# Patient Record
Sex: FEMALE | Race: BLACK OR AFRICAN AMERICAN | Employment: OTHER | URBAN - METROPOLITAN AREA
[De-identification: names, ages, dates, MRNs, and addresses within clinical notes are randomized per-mention and may not be internally consistent; named-entity substitution may affect disease eponyms.]

---

## 2021-06-22 ENCOUNTER — OFFICE VISIT (OUTPATIENT)
Dept: FAMILY MEDICINE CLINIC | Age: 86
End: 2021-06-22
Payer: MEDICAID

## 2021-06-22 VITALS
BODY MASS INDEX: 18.43 KG/M2 | SYSTOLIC BLOOD PRESSURE: 122 MMHG | OXYGEN SATURATION: 100 % | RESPIRATION RATE: 16 BRPM | TEMPERATURE: 97.2 F | DIASTOLIC BLOOD PRESSURE: 67 MMHG | HEART RATE: 60 BPM | WEIGHT: 104 LBS | HEIGHT: 63 IN

## 2021-06-22 DIAGNOSIS — I10 ESSENTIAL HYPERTENSION: ICD-10-CM

## 2021-06-22 DIAGNOSIS — M25.562 CHRONIC PAIN OF LEFT KNEE: Primary | ICD-10-CM

## 2021-06-22 DIAGNOSIS — G89.29 CHRONIC PAIN OF LEFT KNEE: Primary | ICD-10-CM

## 2021-06-22 DIAGNOSIS — J30.9 ALLERGIC RHINITIS, UNSPECIFIED SEASONALITY, UNSPECIFIED TRIGGER: ICD-10-CM

## 2021-06-22 PROCEDURE — 99203 OFFICE O/P NEW LOW 30 MIN: CPT | Performed by: NURSE PRACTITIONER

## 2021-06-22 RX ORDER — FUROSEMIDE 20 MG/1
20 TABLET ORAL DAILY
COMMUNITY
End: 2021-06-22 | Stop reason: SDUPTHER

## 2021-06-22 RX ORDER — DEXTROMETHORPHAN HYDROBROMIDE, GUAIFENESIN 5; 100 MG/5ML; MG/5ML
650 LIQUID ORAL
COMMUNITY

## 2021-06-22 RX ORDER — AMIODARONE HYDROCHLORIDE 100 MG/1
100 TABLET ORAL DAILY
COMMUNITY

## 2021-06-22 RX ORDER — FLUTICASONE PROPIONATE 50 MCG
2 SPRAY, SUSPENSION (ML) NASAL DAILY
COMMUNITY
End: 2021-06-22 | Stop reason: SDUPTHER

## 2021-06-22 RX ORDER — AMLODIPINE BESYLATE 5 MG/1
5 TABLET ORAL DAILY
COMMUNITY
End: 2021-06-22 | Stop reason: SDUPTHER

## 2021-06-22 RX ORDER — FUROSEMIDE 20 MG/1
20 TABLET ORAL DAILY
Qty: 30 TABLET | Refills: 3 | Status: SHIPPED | OUTPATIENT
Start: 2021-06-22 | End: 2022-03-21 | Stop reason: SDUPTHER

## 2021-06-22 RX ORDER — FLUTICASONE PROPIONATE 50 MCG
2 SPRAY, SUSPENSION (ML) NASAL DAILY
Qty: 1 BOTTLE | Refills: 3 | Status: SHIPPED | OUTPATIENT
Start: 2021-06-22

## 2021-06-22 RX ORDER — AMLODIPINE BESYLATE 5 MG/1
5 TABLET ORAL DAILY
Qty: 30 TABLET | Refills: 3 | Status: SHIPPED | OUTPATIENT
Start: 2021-06-22 | End: 2022-03-09 | Stop reason: SDUPTHER

## 2021-06-22 RX ORDER — MELOXICAM 7.5 MG/1
7.5 TABLET ORAL DAILY
Qty: 30 TABLET | Refills: 2 | Status: SHIPPED | OUTPATIENT
Start: 2021-06-22 | End: 2021-09-15

## 2021-06-22 NOTE — PROGRESS NOTES
Sung Crawley is a 80 y.o. female (: 1924) presenting to address:    Chief Complaint   Patient presents with    Knee Pain     left knee going on for two months     Medication Refill     norvasc,flonase lasix       Vitals:    21 1429   BP: 122/67   Pulse: 60   Resp: 16   Temp: 97.2 °F (36.2 °C)   TempSrc: Temporal   SpO2: 100%   Weight: 104 lb (47.2 kg)   Height: 5' 3\" (1.6 m)   PainSc:   2   PainLoc: Knee       Hearing/Vision:   No exam data present    Learning Assessment:   No flowsheet data found. Depression Screening:     3 most recent PHQ Screens 2021   Little interest or pleasure in doing things Not at all   Feeling down, depressed, irritable, or hopeless Not at all   Total Score PHQ 2 0     Fall Risk Assessment:     Fall Risk Assessment, last 12 mths 2021   Able to walk? Yes   Fall in past 12 months? 1   Do you feel unsteady? 1   Are you worried about falling 1   Is TUG test greater than 12 seconds? 1   Is the gait abnormal? 1   Number of falls in past 12 months 1   Fall with injury? 0     Abuse Screening:   No flowsheet data found. Coordination of Care Questionaire:   1. Have you been to the ER, urgent care clinic since your last visit? Hospitalized since your last visit? NO    2. Have you seen or consulted any other health care providers outside of the [address] [place] [address] since your last visit? Include any pap smears or colon screening. NO    Advanced Directive:   1. Do you have an Advanced Directive? NO    2. Would you like information on Advanced Directives?  NO

## 2021-06-29 NOTE — PROGRESS NOTES
ROMULO  Erick Bowden is a 80y.o. year old female who presents today for left knee pain. She is accompanied by her daughter who is the main historian. She recently switched insurances and lost all of her doctors as a result. She has known arthritis and ongoing left knee issues for which she had previously been seeing ortho, but they no longer accept her insurance. Would like a referral.         Past Medical History:   Diagnosis Date    HTN (hypertension)        Past Surgical History:   Procedure Laterality Date    HX CATARACT REMOVAL           Social History     Tobacco Use    Smoking status: Never Smoker    Smokeless tobacco: Never Used   Vaping Use    Vaping Use: Never used   Substance Use Topics    Alcohol use: Never    Drug use: Never         Current Outpatient Medications:     acetaminophen (Tylenol Arthritis Pain) 650 mg TbER, Take 650 mg by mouth two (2) times daily as needed for Pain., Disp: , Rfl:     amiodarone (PACERONE) 100 mg tablet, Take 100 mg by mouth daily. , Disp: , Rfl:     meloxicam (MOBIC) 7.5 mg tablet, Take 1 Tablet by mouth daily. , Disp: 30 Tablet, Rfl: 2    furosemide (LASIX) 20 mg tablet, Take 1 Tablet by mouth daily. , Disp: 30 Tablet, Rfl: 3    amLODIPine (NORVASC) 5 mg tablet, Take 1 Tablet by mouth daily. , Disp: 30 Tablet, Rfl: 3    fluticasone propionate (Flonase Allergy Relief) 50 mcg/actuation nasal spray, 2 Sprays by Both Nostrils route daily. , Disp: 1 Bottle, Rfl: 3     Allergies   Allergen Reactions    Pcn [Penicillins] Swelling       Review of Systems   Constitutional: Negative for chills, fever, malaise/fatigue and weight loss. HENT: Negative for congestion, ear pain, hearing loss, sinus pain, sore throat and tinnitus. Eyes: Negative for blurred vision, double vision, photophobia and pain. Respiratory: Negative for cough and shortness of breath. Cardiovascular: Negative for chest pain, palpitations and leg swelling.    Gastrointestinal: Negative for abdominal pain, constipation, diarrhea, heartburn, nausea and vomiting. Genitourinary: Negative for dysuria, frequency and urgency. Musculoskeletal: Positive for joint pain (left knee). Negative for back pain and myalgias. Skin: Negative for rash. Neurological: Negative for dizziness and headaches. Psychiatric/Behavioral: Negative for depression and suicidal ideas. The patient is not nervous/anxious. PE  /67   Pulse 60   Temp 97.2 °F (36.2 °C) (Temporal)   Resp 16   Ht 5' 3\" (1.6 m) Comment: per patient  Wt 104 lb (47.2 kg)   SpO2 100%   BMI 18.42 kg/m²     Physical Exam  Vitals reviewed. Constitutional:       General: She is not in acute distress. Appearance: Normal appearance. HENT:      Head: Normocephalic and atraumatic. Cardiovascular:      Rate and Rhythm: Normal rate and regular rhythm. Heart sounds: S1 normal and S2 normal. No murmur heard. No friction rub. No gallop. No S3 or S4 sounds. Pulmonary:      Effort: Pulmonary effort is normal.      Breath sounds: Normal breath sounds. No wheezing, rhonchi or rales. Musculoskeletal:      Left knee: Crepitus present. No swelling, effusion or bony tenderness. Normal range of motion. No tenderness. Right lower leg: No edema. Left lower leg: No edema. Skin:     General: Skin is warm and dry. Capillary Refill: Capillary refill takes less than 2 seconds. Neurological:      Mental Status: She is alert and oriented to person, place, and time. Assessment/Plan  1. Knee pain  mobic 7.5 every day  Refer to ortho for further evaluation    2. HTN  BP good  Medications refilled    3.  Preventive care  FU for MWV and fasting labs

## 2021-07-11 PROBLEM — S72.001A CLOSED HIP FRACTURE, RIGHT, INITIAL ENCOUNTER (HCC): Status: ACTIVE | Noted: 2021-07-11

## 2021-08-30 ENCOUNTER — TELEPHONE (OUTPATIENT)
Dept: FAMILY MEDICINE CLINIC | Age: 86
End: 2021-08-30

## 2021-08-30 NOTE — TELEPHONE ENCOUNTER
Pt's daughter called to request a call back. She states her mother is having trouble sleeping at night and wakes up in a panic. She wants to know if there is anything over the counter that is safe for her mother to take to help her sleep. Please advise.

## 2021-09-15 RX ORDER — MELOXICAM 7.5 MG/1
7.5 TABLET ORAL DAILY
Qty: 30 TABLET | Refills: 2 | Status: SHIPPED | OUTPATIENT
Start: 2021-09-15 | End: 2022-01-03 | Stop reason: SDUPTHER

## 2022-01-03 RX ORDER — MELOXICAM 7.5 MG/1
7.5 TABLET ORAL DAILY
Qty: 30 TABLET | Refills: 2 | Status: SHIPPED | OUTPATIENT
Start: 2022-01-03

## 2022-03-09 ENCOUNTER — TELEPHONE (OUTPATIENT)
Dept: FAMILY MEDICINE CLINIC | Age: 87
End: 2022-03-09

## 2022-03-09 RX ORDER — AMLODIPINE BESYLATE 5 MG/1
5 TABLET ORAL DAILY
Qty: 90 TABLET | Refills: 0 | Status: SHIPPED | OUTPATIENT
Start: 2022-03-09

## 2022-03-09 RX ORDER — AMIODARONE HYDROCHLORIDE 100 MG/1
100 TABLET ORAL DAILY
Status: CANCELLED | OUTPATIENT
Start: 2022-03-09

## 2022-03-09 NOTE — TELEPHONE ENCOUNTER
----- Message from Darryn Cliff sent at 3/9/2022 12:14 PM EST -----  Subject: Message to Provider    QUESTIONS  Information for Provider? Patient called in returning a missed call. Office is currently closed. Please follow up again.  ---------------------------------------------------------------------------  --------------  CALL BACK INFO  What is the best way for the office to contact you? OK to leave message on   voicemail  Preferred Call Back Phone Number?  [phone]  ---------------------------------------------------------------------------  --------------  SCRIPT ANSWERS  undefined

## 2022-03-09 NOTE — TELEPHONE ENCOUNTER
Former Kathryn patient she wants to know if she could get a refill on her medication , she has a laurie appointment scheduled . Requested Prescriptions     Pending Prescriptions Disp Refills    amiodarone (PACERONE) 100 mg tablet       Sig: Take 1 Tablet by mouth daily.  amLODIPine (NORVASC) 5 mg tablet 30 Tablet 3     Sig: Take 1 Tablet by mouth daily.      Future Appointments   Date Time Provider Jose Luis Mccoy   3/21/2022  2:00 PM Gunner Daily MD [facility]

## 2022-03-09 NOTE — TELEPHONE ENCOUNTER
Left vm for pt to call the office to inform pt that the nurse called and left vm for question in regards to medication

## 2022-03-09 NOTE — TELEPHONE ENCOUNTER
Amiodarone is a medication prescribed by cardiology not primary care . Amlodipine last refilled 6/22/21 for 30 with 3 refills .  Last OV 6/22/21

## 2022-03-09 NOTE — TELEPHONE ENCOUNTER
----- Message from Iris Maximiliano sent at 3/9/2022 12:14 PM EST -----  Subject: Message to Provider    QUESTIONS  Information for Provider? Patient called in returning a missed call.   Office is currently closed. Please follow up again.  ---------------------------------------------------------------------------  --------------  CALL BACK INFO  What is the best way for the office to contact you? OK to leave message on   voicemail  Preferred Call Back Phone Number? [phone]  ---------------------------------------------------------------------------  --------------  SCRIPT ANSWERS  undefined

## 2022-03-09 NOTE — TELEPHONE ENCOUNTER
Left message for patient the amiodarone needs to be refilled by her cardiologist this medication is not managed by primary care Rajesh . I asked her to call if she has any further questions .

## 2022-03-21 RX ORDER — AMIODARONE HYDROCHLORIDE 100 MG/1
100 TABLET ORAL DAILY
Status: CANCELLED | OUTPATIENT
Start: 2022-03-21

## 2022-03-21 RX ORDER — AMLODIPINE BESYLATE 5 MG/1
5 TABLET ORAL DAILY
Qty: 90 TABLET | Refills: 0 | Status: CANCELLED | OUTPATIENT
Start: 2022-03-21

## 2022-03-21 NOTE — TELEPHONE ENCOUNTER
Patient was late to her appt and wanted to get her meds refilled     Requested Prescriptions     Pending Prescriptions Disp Refills    amiodarone (PACERONE) 100 mg tablet       Sig: Take 1 Tablet by mouth daily.  amLODIPine (NORVASC) 5 mg tablet 90 Tablet 0     Sig: Take 1 Tablet by mouth daily.  furosemide (LASIX) 20 mg tablet 30 Tablet 3     Sig: Take 1 Tablet by mouth daily.      Future Appointments   Date Time Provider Jose Luis Nadine   3/28/2022 11:30 AM Armando Beasley MD [facility]

## 2022-03-22 RX ORDER — FUROSEMIDE 20 MG/1
20 TABLET ORAL DAILY
Qty: 30 TABLET | Refills: 0 | Status: SHIPPED | OUTPATIENT
Start: 2022-03-22

## 2022-03-22 NOTE — TELEPHONE ENCOUNTER
Patient has rescheduled . I left a message stating that she will need to get the Amiodarone from her cardiologist and that the amlodipine was just refilled on 3/5 .

## 2022-03-22 NOTE — TELEPHONE ENCOUNTER
Please advise that furosemide has been refilled for 1 month however she needs to reschedule her office evaluation.   No refills after this without appropriate prior evaluation

## 2022-03-22 NOTE — TELEPHONE ENCOUNTER
Amlodipine was just refilled on 3/9/22 for 90 with 0 refills . Amiodarone is a medication written by cardiology. . Lasix last refilled 6/22/21 for 30 with 3 refills .   Last OV 6/22/21   Future Appointments   Date Time Provider Jose Luis Mccoy   3/28/2022 11:30 AM MD JOSEFINA Ying [facility]

## 2022-05-08 RX ORDER — FUROSEMIDE 20 MG/1
20 TABLET ORAL DAILY
Qty: 30 TABLET | Refills: 0 | OUTPATIENT
Start: 2022-05-08

## 2022-06-22 RX ORDER — AMLODIPINE BESYLATE 5 MG/1
5 TABLET ORAL DAILY
Qty: 90 TABLET | Refills: 0 | OUTPATIENT
Start: 2022-06-22

## 2022-06-22 NOTE — TELEPHONE ENCOUNTER
As previously advised this patient would need to establish care with a new provider before any more refills can be provided